# Patient Record
Sex: FEMALE
[De-identification: names, ages, dates, MRNs, and addresses within clinical notes are randomized per-mention and may not be internally consistent; named-entity substitution may affect disease eponyms.]

---

## 2019-06-17 PROBLEM — Z00.00 ENCOUNTER FOR PREVENTIVE HEALTH EXAMINATION: Status: ACTIVE | Noted: 2019-06-17

## 2019-06-18 ENCOUNTER — APPOINTMENT (OUTPATIENT)
Dept: OTOLARYNGOLOGY | Facility: CLINIC | Age: 24
End: 2019-06-18
Payer: COMMERCIAL

## 2019-06-18 DIAGNOSIS — R07.0 PAIN IN THROAT: ICD-10-CM

## 2019-06-18 DIAGNOSIS — Z87.09 PERSONAL HISTORY OF OTHER DISEASES OF THE RESPIRATORY SYSTEM: ICD-10-CM

## 2019-06-18 DIAGNOSIS — Z78.9 OTHER SPECIFIED HEALTH STATUS: ICD-10-CM

## 2019-06-18 DIAGNOSIS — Z80.9 FAMILY HISTORY OF MALIGNANT NEOPLASM, UNSPECIFIED: ICD-10-CM

## 2019-06-18 DIAGNOSIS — Z83.3 FAMILY HISTORY OF DIABETES MELLITUS: ICD-10-CM

## 2019-06-18 DIAGNOSIS — K21.9 GASTRO-ESOPHAGEAL REFLUX DISEASE W/OUT ESOPHAGITIS: ICD-10-CM

## 2019-06-18 PROCEDURE — 31575 DIAGNOSTIC LARYNGOSCOPY: CPT

## 2019-06-18 PROCEDURE — 99203 OFFICE O/P NEW LOW 30 MIN: CPT | Mod: 25

## 2019-06-18 RX ORDER — DIPHENHYDRAMINE HYDROCHLORIDE AND LIDOCAINE HYDROCHLORIDE AND ALUMINUM HYDROXIDE AND MAGNESIUM HYDRO
KIT 4 TIMES DAILY
Qty: 60 | Refills: 0 | Status: ACTIVE | COMMUNITY
Start: 2019-06-18 | End: 1900-01-01

## 2019-06-18 NOTE — ASSESSMENT
[FreeTextEntry1] : She has a 2-3 day history of throat pain.  There is no erythema or exudate in the oropharynx on exam. A culture was sent to rule out bacterial infection. Flexible laryngoscopy reveals mild reflux related laryngeal changes. There were no suspicious masses or lesions. She may have an early  infection.  she may also haver reflux. \par \par PLAN\par \par -findings and management options discussed in detail with the patient. \par - Hydration, humidification, voice rest as needed recommended.  \par - Saltwater gargles.  The patient was asked to avoid alcohol based mouthwashes\par - a culture was sent to rule out infection. The patient was asked to call for the results.\par - Reflux precautions and elevation of the head of bed at night.  Literature given.\par - consider a trial of zantac\par - she was given Magic mouthwash to use as needed for the pain\par - follow up in 1-2 weeks. \par - call and return earlier if any concerns

## 2019-06-18 NOTE — HISTORY OF PRESENT ILLNESS
[de-identified] : BRETT BORREGO is a 23 year patient with a 2-3 day history of throat pain and odynophagia.  She does not feel sick. she has no fevers, voice change, dysphagia, cough or nasal/sinus symptoms. She denies a history of reflux.  She has allergies but they have not been bad.   SHe has a little improvement with Advil.

## 2019-06-24 LAB — BACTERIA THROAT CULT: NORMAL

## 2019-08-12 ENCOUNTER — APPOINTMENT (OUTPATIENT)
Dept: HUMAN REPRODUCTION | Facility: CLINIC | Age: 24
End: 2019-08-12
Payer: SELF-PAY

## 2019-08-12 DIAGNOSIS — N97.9 FEMALE INFERTILITY, UNSPECIFIED: ICD-10-CM

## 2019-08-12 PROCEDURE — 76830 TRANSVAGINAL US NON-OB: CPT

## 2019-08-12 PROCEDURE — 36415 COLL VENOUS BLD VENIPUNCTURE: CPT

## 2019-08-12 PROCEDURE — 99213 OFFICE O/P EST LOW 20 MIN: CPT | Mod: 25

## 2019-08-13 RX ORDER — CHORIONIC GONADOTROPIN 10000 UNIT
10000 KIT INTRAMUSCULAR
Qty: 1 | Refills: 3 | Status: ACTIVE | COMMUNITY
Start: 2019-08-12 | End: 1900-01-01

## 2019-08-17 ENCOUNTER — APPOINTMENT (OUTPATIENT)
Dept: HUMAN REPRODUCTION | Facility: CLINIC | Age: 24
End: 2019-08-17

## 2019-08-19 ENCOUNTER — APPOINTMENT (OUTPATIENT)
Dept: HUMAN REPRODUCTION | Facility: CLINIC | Age: 24
End: 2019-08-19

## 2019-09-16 ENCOUNTER — APPOINTMENT (OUTPATIENT)
Dept: HUMAN REPRODUCTION | Facility: CLINIC | Age: 24
End: 2019-09-16
Payer: SELF-PAY

## 2019-09-16 PROCEDURE — 99213 OFFICE O/P EST LOW 20 MIN: CPT | Mod: 25

## 2019-09-16 PROCEDURE — 76817 TRANSVAGINAL US OBSTETRIC: CPT

## 2019-09-23 ENCOUNTER — APPOINTMENT (OUTPATIENT)
Dept: HUMAN REPRODUCTION | Facility: CLINIC | Age: 24
End: 2019-09-23
Payer: SELF-PAY

## 2019-09-23 PROCEDURE — 76830 TRANSVAGINAL US NON-OB: CPT

## 2019-09-23 PROCEDURE — 36415 COLL VENOUS BLD VENIPUNCTURE: CPT

## 2019-09-23 PROCEDURE — 99213Y: CUSTOM | Mod: 25

## 2019-09-30 ENCOUNTER — APPOINTMENT (OUTPATIENT)
Dept: HUMAN REPRODUCTION | Facility: CLINIC | Age: 24
End: 2019-09-30
Payer: SELF-PAY

## 2019-09-30 PROCEDURE — 99213 OFFICE O/P EST LOW 20 MIN: CPT | Mod: 25

## 2019-09-30 PROCEDURE — 76817 TRANSVAGINAL US OBSTETRIC: CPT

## 2019-10-07 ENCOUNTER — APPOINTMENT (OUTPATIENT)
Dept: HUMAN REPRODUCTION | Facility: CLINIC | Age: 24
End: 2019-10-07
Payer: SELF-PAY

## 2019-10-07 PROCEDURE — 76817 TRANSVAGINAL US OBSTETRIC: CPT

## 2019-10-07 PROCEDURE — 99213 OFFICE O/P EST LOW 20 MIN: CPT | Mod: 25

## 2021-01-12 ENCOUNTER — NON-APPOINTMENT (OUTPATIENT)
Age: 26
End: 2021-01-12

## 2021-01-12 ENCOUNTER — APPOINTMENT (OUTPATIENT)
Dept: OTOLARYNGOLOGY | Facility: CLINIC | Age: 26
End: 2021-01-12
Payer: COMMERCIAL

## 2021-01-12 DIAGNOSIS — Q89.2 CONGENITAL MALFORMATIONS OF OTHER ENDOCRINE GLANDS: ICD-10-CM

## 2021-01-12 DIAGNOSIS — K21.9 GASTRO-ESOPHAGEAL REFLUX DISEASE W/OUT ESOPHAGITIS: ICD-10-CM

## 2021-01-12 PROCEDURE — 31575 DIAGNOSTIC LARYNGOSCOPY: CPT

## 2021-01-12 PROCEDURE — 99213 OFFICE O/P EST LOW 20 MIN: CPT | Mod: 25

## 2021-01-12 PROCEDURE — 99072 ADDL SUPL MATRL&STAF TM PHE: CPT

## 2021-01-12 RX ORDER — IBUPROFEN 600 MG/1
600 TABLET, FILM COATED ORAL
Qty: 21 | Refills: 0 | Status: ACTIVE | COMMUNITY
Start: 2020-09-11

## 2021-01-12 RX ORDER — NORETHINDRONE ACETATE AND ETHINYL ESTRADIOL AND FERROUS FUMARATE 1MG-20(21)
1-20 KIT ORAL
Qty: 84 | Refills: 0 | Status: ACTIVE | COMMUNITY
Start: 2021-01-05

## 2021-01-12 NOTE — ASSESSMENT
[FreeTextEntry1] : BRETT BORREGO has probables TGD. I suggest CT.\par She apperaz to be having LPR. I suggested and discussed in detail a strict reflux diet and gave the patient a handout.\par \par I am recommending Pepcid 20mg  before bedtime.\par \par

## 2021-01-12 NOTE — HISTORY OF PRESENT ILLNESS
[de-identified] : BRETT BORREGO is a 25 year woman with a history of a lump in the submental area about 3 weeks ago. It had been larger and more tender but si better.\par \par She also complains of intermittent PND and throat clearing.